# Patient Record
Sex: MALE
[De-identification: names, ages, dates, MRNs, and addresses within clinical notes are randomized per-mention and may not be internally consistent; named-entity substitution may affect disease eponyms.]

---

## 2021-01-28 PROBLEM — Z00.00 ENCOUNTER FOR PREVENTIVE HEALTH EXAMINATION: Status: ACTIVE | Noted: 2021-01-28

## 2021-02-04 ENCOUNTER — APPOINTMENT (OUTPATIENT)
Dept: SURGERY | Facility: CLINIC | Age: 46
End: 2021-02-04
Payer: COMMERCIAL

## 2021-02-04 VITALS
HEART RATE: 91 BPM | TEMPERATURE: 97.1 F | BODY MASS INDEX: 35.7 KG/M2 | DIASTOLIC BLOOD PRESSURE: 93 MMHG | SYSTOLIC BLOOD PRESSURE: 148 MMHG | WEIGHT: 241 LBS | OXYGEN SATURATION: 97 % | HEIGHT: 69 IN

## 2021-02-04 DIAGNOSIS — Z82.49 FAMILY HISTORY OF ISCHEMIC HEART DISEASE AND OTHER DISEASES OF THE CIRCULATORY SYSTEM: ICD-10-CM

## 2021-02-04 DIAGNOSIS — Z72.89 OTHER PROBLEMS RELATED TO LIFESTYLE: ICD-10-CM

## 2021-02-04 DIAGNOSIS — Z87.891 PERSONAL HISTORY OF NICOTINE DEPENDENCE: ICD-10-CM

## 2021-02-04 DIAGNOSIS — K21.9 GASTRO-ESOPHAGEAL REFLUX DISEASE W/OUT ESOPHAGITIS: ICD-10-CM

## 2021-02-04 DIAGNOSIS — Z86.59 PERSONAL HISTORY OF OTHER MENTAL AND BEHAVIORAL DISORDERS: ICD-10-CM

## 2021-02-04 DIAGNOSIS — Z80.0 FAMILY HISTORY OF MALIGNANT NEOPLASM OF DIGESTIVE ORGANS: ICD-10-CM

## 2021-02-04 DIAGNOSIS — Z78.9 OTHER SPECIFIED HEALTH STATUS: ICD-10-CM

## 2021-02-04 DIAGNOSIS — Z83.438 FAMILY HISTORY OF OTHER DISORDER OF LIPOPROTEIN METABOLISM AND OTHER LIPIDEMIA: ICD-10-CM

## 2021-02-04 DIAGNOSIS — Z87.09 PERSONAL HISTORY OF OTHER DISEASES OF THE RESPIRATORY SYSTEM: ICD-10-CM

## 2021-02-04 DIAGNOSIS — Z86.69 PERSONAL HISTORY OF OTHER DISEASES OF THE NERVOUS SYSTEM AND SENSE ORGANS: ICD-10-CM

## 2021-02-04 DIAGNOSIS — K43.2 INCISIONAL HERNIA W/OUT OBSTRUCTION OR GANGRENE: ICD-10-CM

## 2021-02-04 DIAGNOSIS — A74.9 CHLAMYDIAL INFECTION, UNSPECIFIED: ICD-10-CM

## 2021-02-04 DIAGNOSIS — Z82.69 FAMILY HISTORY OF OTHER DISEASES OF THE MUSCULOSKELETAL SYSTEM AND CONNECTIVE TISSUE: ICD-10-CM

## 2021-02-04 DIAGNOSIS — Z87.19 PERSONAL HISTORY OF OTHER DISEASES OF THE DIGESTIVE SYSTEM: ICD-10-CM

## 2021-02-04 DIAGNOSIS — Z86.19 PERSONAL HISTORY OF OTHER INFECTIOUS AND PARASITIC DISEASES: ICD-10-CM

## 2021-02-04 PROCEDURE — 99072 ADDL SUPL MATRL&STAF TM PHE: CPT

## 2021-02-04 PROCEDURE — 99204 OFFICE O/P NEW MOD 45 MIN: CPT

## 2021-02-04 RX ORDER — AMLODIPINE BESYLATE 5 MG/1
TABLET ORAL
Refills: 0 | Status: ACTIVE | COMMUNITY

## 2021-02-04 RX ORDER — LISINOPRIL 30 MG/1
TABLET ORAL
Refills: 0 | Status: ACTIVE | COMMUNITY

## 2021-02-04 RX ORDER — BUPROPION HYDROCHLORIDE 450 MG/1
TABLET, FILM COATED, EXTENDED RELEASE ORAL
Refills: 0 | Status: ACTIVE | COMMUNITY

## 2021-02-04 RX ORDER — ALPRAZOLAM 2 MG/1
TABLET ORAL
Refills: 0 | Status: ACTIVE | COMMUNITY

## 2021-02-04 RX ORDER — ESCITALOPRAM OXALATE 5 MG/1
TABLET, FILM COATED ORAL
Refills: 0 | Status: ACTIVE | COMMUNITY

## 2021-02-04 RX ORDER — VARENICLINE TARTRATE 1 MG/1
TABLET, FILM COATED ORAL
Refills: 0 | Status: ACTIVE | COMMUNITY

## 2021-02-04 NOTE — PHYSICAL EXAM
[Calm] : calm [de-identified] : NAD, comfortable [de-identified] : NCAT, no scleral icterus [de-identified] : Supple, no JVD or cervical lymphadenopathy [de-identified] : CTAB [de-identified] : S1S2 normal, RRR [de-identified] : +BS soft NT ND.  No hepatosplenomegaly.  Well-healed laparoscopic incisions from his prior appendectomy.  In the left lower quadrant near the scar, there is a palpable mass that is not fully reducible but is non-tender. [de-identified] : No clubbing, cyanosis, or edema. [de-identified] : Warm, dry. [de-identified] : A&Ox3

## 2021-02-04 NOTE — REASON FOR VISIT
[Consultation] : a consultation visit [FreeTextEntry1] : Consultation requested by: Dr. Stevan Harvey

## 2021-02-04 NOTE — REVIEW OF SYSTEMS
[Recent Weight Gain (___ Lbs)] : recent [unfilled] ~Ulb weight gain [Diarrhea] : diarrhea [Heartburn] : heartburn [Arthralgias] : arthralgias [Joint Pain] : joint pain [Itching] : itching [Dry Skin] : dry skin [An Unusual Growth] : an unusual growth on the skin [Anxiety] : anxiety [Depression] : depression [Erectile Dysfunction] : erectile dysfunction [Swollen Glands In The Neck] : swollen glands in the neck [Negative] : Neurological [Fever] : no fever [Chills] : no chills [Feeling Poorly] : not feeling poorly [Feeling Tired] : not feeling tired [Recent Weight Loss (___ Lbs)] : no recent weight loss [Abdominal Pain] : no abdominal pain [Vomiting] : no vomiting [Constipation] : no constipation [Melena] : no melena [Joint Swelling] : no joint swelling [Joint Stiffness] : no joint stiffness [Limb Pain] : no limb pain [Limb Swelling] : no limb swelling [Skin Lesions] : no skin lesions [Skin Wound] : no skin wound [Change In A Mole] : no change in a mole [Suicidal] : not suicidal [Sleep Disturbances] : no sleep disturbances [Change In Personality] : no personality change [Emotional Problems] : no emotional problems [Proptosis] : no proptosis [Hot Flashes] : no hot flashes [Muscle Weakness] : no muscle weakness [Deepening Of The Voice] : no deepening of the voice [Feelings Of Weakness] : no feelings of weakness [Easy Bleeding] : no tendency for easy bleeding [Easy Bruising] : no tendency for easy bruising [Swollen Glands] : no swollen glands

## 2021-02-04 NOTE — ASSESSMENT
[FreeTextEntry1] : Mr. Dejesus is a 45-year-old man with a left lower abdominal incisional hernia.  We will obtain the ultrasound report.  As the hernia is not enlarging or currently causing him discomfort, he would prefer to delay surgery at this time.  We will plan for a robotic-assisted incisional hernia repair with mesh at the patient's convenience.

## 2021-02-04 NOTE — HISTORY OF PRESENT ILLNESS
[de-identified] : Mr. Dejesus presented today for evaluation and management of a left lower abdominal spigelian hernia.  He stated the hernia has been present for 3-4 months.  He first noticed the hernia as a bulge.  He denied significant pain of the area, although it is sensitive to pressure.  He stated the hernia is not enlarging.  He underwent an ultrasound (which is not currently available to review) that diagnosed a spigelian hernia.

## 2021-02-04 NOTE — DATA REVIEWED
[No studies available for review at this time.] : No studies available for review at this time. [FreeTextEntry1] : US abdomen - not available at the time of his visit.

## 2021-02-04 NOTE — CONSULT LETTER
[FreeTextEntry1] : 2021\par \par \par \par Stevan Harvey M.D.\par [facility]\par [address], [address]\[place] [address]\par Telephone #: [phone]\par \par \par Re: Justyn Dejesus\par : 1975\par \par \par Dear Dr. Harvey:\par \par I had the opportunity to see Mr. Dejesus today for evaluation and management of a left lower abdominal spigelian hernia.  He stated the hernia has been present for 3-4 months.  He first noticed the hernia as a bulge.  He denied significant pain of the area, although it is sensitive to pressure.  He stated the hernia is not enlarging.  He underwent an ultrasound (which is not currently available to review) that diagnosed a spigelian hernia.\par \par On physical examination, his height is 5 feet 9 inches, weight is 241 pounds, and BMI is 35.59.  His temperature is 97.1 °F, blood pressure is 148/93, heart rate 91, and O2 saturation is 97% on room air.  In general, he is a well-dressed, well-nourished man who appears his stated age and is in no acute distress. He is calm, alert, and oriented x3.  HEENT exam demonstrates no scleral icterus and a normocephalic atraumatic appearance.  Neck is supple without JVD or cervical lymphadenopathy.  Heart sounds S1 S2 are normal with a regular rate and rhythm.  Lungs are clear to auscultation bilaterally.  His abdomen has audible bowel sounds, is soft, non-tender, and non-distended.  There is no hepatosplenomegaly.  His extremities are warm and dry with no clubbing, cyanosis, or edema.  There are well-healed incisions from his laparoscopic appendectomy.  In the left lower quadrant near the scar, there is a palpable mass that is not fully reducible but is non-tender.\par \par In summary, Mr. Dejesus is a 45-year-old man with a left lower abdominal incisional hernia.  We will obtain the ultrasound report.  As the hernia is not enlarging or currently causing him discomfort, he would prefer to delay surgery at this time.  We will plan for a robotic-assisted incisional hernia repair with mesh at the patient's convenience.\par \par Thank you for the opportunity to care for this patient.  Please do not hesitate to contact me in the event that you have any questions or concerns about the care of this patient.\par \par Sincerely,\par \par \par \par Kimberley Adams M.D.